# Patient Record
Sex: FEMALE | Race: WHITE | NOT HISPANIC OR LATINO | Employment: OTHER | URBAN - METROPOLITAN AREA
[De-identification: names, ages, dates, MRNs, and addresses within clinical notes are randomized per-mention and may not be internally consistent; named-entity substitution may affect disease eponyms.]

---

## 2021-11-19 PROBLEM — M81.0 SENILE OSTEOPOROSIS: Status: ACTIVE | Noted: 2021-11-19

## 2021-11-19 PROBLEM — E78.5 HYPERLIPIDEMIA: Status: ACTIVE | Noted: 2018-05-24

## 2021-11-19 PROBLEM — M15.0 PRIMARY GENERALIZED (OSTEO)ARTHRITIS: Status: ACTIVE | Noted: 2021-11-19

## 2021-11-22 PROBLEM — M53.3 SI (SACROILIAC) JOINT DYSFUNCTION: Status: ACTIVE | Noted: 2021-11-22

## 2021-11-22 PROBLEM — Z85.43 HISTORY OF OVARIAN CANCER: Status: ACTIVE | Noted: 2021-11-22

## 2021-11-22 PROBLEM — R76.8 RHEUMATOID FACTOR POSITIVE: Status: ACTIVE | Noted: 2021-11-22

## 2021-11-22 PROBLEM — M47.816 LUMBAR SPONDYLOSIS: Status: ACTIVE | Noted: 2021-11-22

## 2022-06-22 PROBLEM — E55.9 VITAMIN D DEFICIENCY: Status: ACTIVE | Noted: 2022-06-22

## 2024-01-30 ENCOUNTER — TELEPHONE (OUTPATIENT)
Age: 79
End: 2024-01-30

## 2024-02-06 ENCOUNTER — OFFICE VISIT (OUTPATIENT)
Age: 79
End: 2024-02-06
Payer: MEDICARE

## 2024-02-06 VITALS
WEIGHT: 120.8 LBS | DIASTOLIC BLOOD PRESSURE: 82 MMHG | HEIGHT: 61 IN | HEART RATE: 80 BPM | TEMPERATURE: 97.9 F | SYSTOLIC BLOOD PRESSURE: 132 MMHG | OXYGEN SATURATION: 96 % | BODY MASS INDEX: 22.81 KG/M2

## 2024-02-06 DIAGNOSIS — E55.9 VITAMIN D DEFICIENCY: ICD-10-CM

## 2024-02-06 DIAGNOSIS — M06.09 SERONEGATIVE ARTHROPATHY OF MULTIPLE SITES (HCC): ICD-10-CM

## 2024-02-06 DIAGNOSIS — M15.0 PRIMARY GENERALIZED (OSTEO)ARTHRITIS: ICD-10-CM

## 2024-02-06 DIAGNOSIS — M53.3 SI (SACROILIAC) JOINT DYSFUNCTION: ICD-10-CM

## 2024-02-06 DIAGNOSIS — R76.8 RHEUMATOID FACTOR POSITIVE: ICD-10-CM

## 2024-02-06 DIAGNOSIS — M15.4 EROSIVE OSTEOARTHRITIS OF BOTH HANDS: ICD-10-CM

## 2024-02-06 DIAGNOSIS — M81.0 SENILE OSTEOPOROSIS: Primary | ICD-10-CM

## 2024-02-06 DIAGNOSIS — M47.816 LUMBAR SPONDYLOSIS: ICD-10-CM

## 2024-02-06 PROCEDURE — 99214 OFFICE O/P EST MOD 30 MIN: CPT | Performed by: INTERNAL MEDICINE

## 2024-02-06 RX ORDER — ALENDRONATE SODIUM 70 MG/1
70 TABLET ORAL
Qty: 12 TABLET | Refills: 3 | Status: SHIPPED | OUTPATIENT
Start: 2024-02-06

## 2024-02-06 NOTE — PATIENT INSTRUCTIONS
Set up for your musculoskeletal ultrasound of your hands and wrists at [facility].  Ask Yolanda about where you should go for occupational hand therapy.  Get the lab work dated for tomorrow in the next day or 2.  Get the lab work dated for 3 months from now right before your next office visit.  Start the Fosamax weekly.  On the day that you take it, take it first thing in the morning on an empty stomach with 8 ounces of tap water.  After you take it you have to sit up or stand up for 30 minutes after which you can eat breakfast and have coffee.

## 2024-02-06 NOTE — PROGRESS NOTES
Assessment/Plan:    Senile osteoporosis  History osteoporosis treated initially with Fosamax and subsequently with Prolia.  She did have her last dose of Prolia in August.  Repeat DEXA with osteopenia left forearm.  We had discussed transitioning off of Prolia in view of stable 2.  BMD.  She would like to transition to using Fosamax rather than Reclast.  Will treat her with 1 year of Fosamax therapy and continue to monitor bone density every 2 years.  She will be due in May 2025.  Continue calcium and vitamin D supplement.  Continue home exercise program as tolerated.  She was given full instructions on dosing of Fosamax.    Primary generalized (osteo)arthritis  Primary generalized osteoarthritis with interphalangeal osteoarthritis hands and feet with deformities bilateral ring finger DIP joints.  DJD knees.  History lumbar spondylosis status post lumbar laminectomy and discectomy April 2022.  Patient referred for musculoskeletal ultrasound bilateral hands and wrist to rule out erosive osteoarthritis.  Would consider Plaquenil depending on symptoms.  Would also consider steroid injection in finger PIP joints if indicated.  Suggest occupational therapy.  Prescription given.  Follow-up 3 to 4 months or sooner if needed.    Seronegative arthropathy of multiple sites (HCC)  Prominent interphalangeal osteoarthritis.  Rule out erosive osteoarthritis.  Patient referred for musculoskeletal ultrasound bilateral hands and wrists.  Will consider Plaquenil if symptoms warrant.  Patient also referred for occupational therapy.    Erosive osteoarthritis of both hands  Interphalangeal osteoarthritis with hand arthralgias.  Rule out inflammatory OA versus erosive OA.  Patient referred for musculoskeletal ultrasound.  She was also referred for occupational therapy.  Depending on results of her musculoskeletal ultrasound and response to occupational therapy, would consider Plaquenil if symptoms warrant.  Follow-up 3 to 4 months or  sooner if needed.  Refer for laboratory evaluation to include repeat serologies to monitor for disease activity.    SI (sacroiliac) joint dysfunction  Rare low back pain status post lumbar laminectomy/discectomy.  Encourage home stretching and exercise program as tolerated.  Continue to monitor.    Rheumatoid factor positive  History positive rheumatoid factor with no clinical signs or symptoms of rheumatoid arthritis.  Rule out erosive osteoarthritis.  Patient referred for musculoskeletal ultrasound and occupational therapy for hand arthralgias.  Repeat serologies to monitor for disease activity.  Consider Plaquenil depending on response to occupational therapy and ongoing symptoms.         Problem List Items Addressed This Visit     Senile osteoporosis - Primary     History osteoporosis treated initially with Fosamax and subsequently with Prolia.  She did have her last dose of Prolia in August.  Repeat DEXA with osteopenia left forearm.  We had discussed transitioning off of Prolia in view of stable 2.  BMD.  She would like to transition to using Fosamax rather than Reclast.  Will treat her with 1 year of Fosamax therapy and continue to monitor bone density every 2 years.  She will be due in May 2025.  Continue calcium and vitamin D supplement.  Continue home exercise program as tolerated.  She was given full instructions on dosing of Fosamax.         Relevant Medications    alendronate (FOSAMAX) 70 mg tablet    Other Relevant Orders    Basic metabolic panel    Primary generalized (osteo)arthritis     Primary generalized osteoarthritis with interphalangeal osteoarthritis hands and feet with deformities bilateral ring finger DIP joints.  DJD knees.  History lumbar spondylosis status post lumbar laminectomy and discectomy April 2022.  Patient referred for musculoskeletal ultrasound bilateral hands and wrist to rule out erosive osteoarthritis.  Would consider Plaquenil depending on symptoms.  Would also consider  steroid injection in finger PIP joints if indicated.  Suggest occupational therapy.  Prescription given.  Follow-up 3 to 4 months or sooner if needed.         SI (sacroiliac) joint dysfunction     Rare low back pain status post lumbar laminectomy/discectomy.  Encourage home stretching and exercise program as tolerated.  Continue to monitor.         Rheumatoid factor positive     History positive rheumatoid factor with no clinical signs or symptoms of rheumatoid arthritis.  Rule out erosive osteoarthritis.  Patient referred for musculoskeletal ultrasound and occupational therapy for hand arthralgias.  Repeat serologies to monitor for disease activity.  Consider Plaquenil depending on response to occupational therapy and ongoing symptoms.         Lumbar spondylosis    Vitamin D deficiency    Relevant Orders    Vitamin D 25 hydroxy    Seronegative arthropathy of multiple sites (HCC)     Prominent interphalangeal osteoarthritis.  Rule out erosive osteoarthritis.  Patient referred for musculoskeletal ultrasound bilateral hands and wrists.  Will consider Plaquenil if symptoms warrant.  Patient also referred for occupational therapy.         Relevant Orders    RF Screen w/ Reflex to Titer    Cyclic citrul peptide antibody, IgG    [facility] Comprehensive Panel    [place] MSK limited    C-reactive protein    Sedimentation rate, automated    CBC and differential    Comprehensive metabolic panel    Urinalysis with microscopic    Ambulatory Referral to Occupational Therapy    Erosive osteoarthritis of both hands     Interphalangeal osteoarthritis with hand arthralgias.  Rule out inflammatory OA versus erosive OA.  Patient referred for musculoskeletal ultrasound.  She was also referred for occupational therapy.  Depending on results of her musculoskeletal ultrasound and response to occupational therapy, would consider Plaquenil if symptoms warrant.  Follow-up 3 to 4 months or sooner if needed.  Refer for laboratory evaluation to include  repeat serologies to monitor for disease activity.         Relevant Orders    RF Screen w/ Reflex to Titer    Cyclic citrul peptide antibody, IgG    [facility] Comprehensive Panel    [place] MSK limited    Ambulatory Referral to Occupational Therapy           Reviewed records, labs, and imaging with the patient in detail.  Counseled patient.  Discussion regarding my findings and recommendations.  Office visit with documentation 35 min.    Subjective:      Patient ID: Betty King is a 78 y.o. female.    Patient with osteoporosis.  She has previously been treated with Fosamax and continues on Prolia injections.  She would be due for dose #19 today.  She had a DEXA scan done in May 2023 which revealed stable to improved bone density.      She has a history of osteoarthritis as well.  She does note ring finger PIP soft tissue prominence.  She has no prolonged morning stiffness but does note chronic hand arthralgias and knee arthralgias.  She exercises regularly.  In April 2022 she underwent a lumbar laminectomy and diskectomy.   She has done very well since then.  She has a history of a positive rheumatoid factor in the past with a negative CCP antibody, however she has never developed any signs or symptoms of inflammatory arthritis.      Review of lab work dated 7/22/2023 significant for creatinine 0.77.  Estimated GFR 80.  Calcium 9.5.  Fasting blood sugar 115.  Vitamin D low at 28 treated.  Lab work dated 7/6/2023 significant for ferritin 42.  Serum iron 125.  Sodium 130.  Chloride 95.  Glucose 235.  Estimated GFR 76.  Folic acid 21.8.  Vitamin B12 501.  Methylmalonic acid 203.  CBC unremarkable.    DEXA dated 5/9/2023 significant for spine T score -0.6 L1-L2 stable.  Suspect this is falsely elevated secondary to scoliosis and degenerative spondylosis.  Left total hip -1.0 with an increase of 3.4% as compared to the prior study.  Femoral neck -0.3.  Left forearm -1.7 with an increase of 5.8% as compared to the prior study.   Patient has no history of adult fracture.        Allergies  Allergies   Allergen Reactions   • Levofloxacin Other (See Comments)   • Penicillins Other (See Comments)   • Sulfa Antibiotics Other (See Comments)       Home Medications    Current Outpatient Medications:   •  alendronate (FOSAMAX) 70 mg tablet, Take 1 tablet (70 mg total) by mouth every 7 days, Disp: 12 tablet, Rfl: 3  •  amLODIPine (NORVASC) 5 mg tablet, , Disp: , Rfl:   •  ascorbic acid (VITAMIN C) 500 MG tablet, , Disp: , Rfl:   •  Biotin 5 MG TBDP, Take by mouth, Disp: , Rfl:   •  Cholecalciferol 100 MCG (4000 UT) TABS, Take 4,000 capsules by mouth, Disp: , Rfl:   •  Estradiol 0.5 MG/0.5GM GEL, Place on the skin, Disp: , Rfl:   •  Cranberry 450 MG CAPS, Take by mouth, Disp: , Rfl:   •  ergocalciferol (VITAMIN D2) 50,000 units, Take 1 capsule (50,000 Units total) by mouth once a week for 8 doses, Disp: 8 capsule, Rfl: 0  •  lisinopril-hydrochlorothiazide (PRINZIDE,ZESTORETIC) 20-12.5 MG per tablet, Take 1 tablet by mouth daily, Disp: , Rfl:   •  Probiotic Product (PROBIOTIC PO), Take by mouth, Disp: , Rfl:     Past Medical History  History reviewed. No pertinent past medical history.    Past Surgical History   History reviewed. No pertinent surgical history.    Family History   History reviewed. No pertinent family history.    The following portions of the patient's history were reviewed and updated as appropriate: allergies, current medications, past family history, past medical history, past social history, past surgical history, and problem list.    Review of Systems   Constitutional:  Negative for chills, fatigue and fever.   HENT:  Negative for hearing loss, sore throat and tinnitus.    Eyes:  Negative for pain and visual disturbance.   Respiratory:  Negative for cough and shortness of breath.    Cardiovascular:  Negative for chest pain and palpitations.   Gastrointestinal:  Negative for abdominal pain, nausea and vomiting.   Genitourinary:   "Negative for difficulty urinating.   Musculoskeletal:  Positive for arthralgias (hand and knee arthralgias). Negative for back pain, gait problem, joint swelling, myalgias, neck pain and neck stiffness.   Skin:  Negative for rash.   Neurological:  Negative for dizziness, seizures, weakness, numbness and headaches.   Psychiatric/Behavioral:  Negative for confusion, decreased concentration and sleep disturbance.          Objective:      /82 (BP Location: Right arm, Patient Position: Sitting, Cuff Size: Adult)   Pulse 80   Temp 97.9 °F (36.6 °C) (Tympanic)   Ht 5' 1\" (1.549 m)   Wt 54.8 kg (120 lb 12.8 oz)   SpO2 96%   BMI 22.82 kg/m²          Physical Exam  Vitals reviewed.   Constitutional:       Appearance: Normal appearance.   HENT:      Head: Normocephalic.      Nose:      Comments: Nose and throat unremarkable.  Eyes:      Extraocular Movements: Extraocular movements intact.   Neck:      Comments: Without masses, thyromegaly, lymphadenopathy  Cardiovascular:      Rate and Rhythm: Normal rate and regular rhythm.   Pulmonary:      Breath sounds: Normal breath sounds.   Abdominal:      Palpations: Abdomen is soft.   Musculoskeletal:      Cervical back: Neck supple.      Comments: Decreased lateral flexion neck.  Full range of motion bilateral shoulders, elbows, wrists.  Interphalangeal OA changes, squaring 1st CMC joints, Heberden's nodes, Selina's nodes bilateral hands. Deformities RF PIP's. No synovitis noted.  Full range of motion bilateral hips, knees, ankles.  Patellofemoral crepitus noted bilateral knees.  Hyperpronation bilateral feet.   Skin:     General: Skin is warm and dry.   Neurological:      General: No focal deficit present.      Mental Status: She is alert.               This note was written in part using the assistance of the [employer] [id number]-or-rouw microphone system. Those portions using this system have been dictated and not read.  "

## 2024-02-08 ENCOUNTER — TELEPHONE (OUTPATIENT)
Age: 79
End: 2024-02-08

## 2024-02-26 NOTE — ASSESSMENT & PLAN NOTE
Primary generalized osteoarthritis with interphalangeal osteoarthritis hands and feet with deformities bilateral ring finger DIP joints.  DJD knees.  History lumbar spondylosis status post lumbar laminectomy and discectomy April 2022.  Patient referred for musculoskeletal ultrasound bilateral hands and wrist to rule out erosive osteoarthritis.  Would consider Plaquenil depending on symptoms.  Would also consider steroid injection in finger PIP joints if indicated.  Suggest occupational therapy.  Prescription given.  Follow-up 3 to 4 months or sooner if needed.

## 2024-02-26 NOTE — ASSESSMENT & PLAN NOTE
History positive rheumatoid factor with no clinical signs or symptoms of rheumatoid arthritis.  Rule out erosive osteoarthritis.  Patient referred for musculoskeletal ultrasound and occupational therapy for hand arthralgias.  Repeat serologies to monitor for disease activity.  Consider Plaquenil depending on response to occupational therapy and ongoing symptoms.

## 2024-02-26 NOTE — ASSESSMENT & PLAN NOTE
History osteoporosis treated initially with Fosamax and subsequently with Prolia.  She did have her last dose of Prolia in August.  Repeat DEXA with osteopenia left forearm.  We had discussed transitioning off of Prolia in view of stable 2.  BMD.  She would like to transition to using Fosamax rather than Reclast.  Will treat her with 1 year of Fosamax therapy and continue to monitor bone density every 2 years.  She will be due in May 2025.  Continue calcium and vitamin D supplement.  Continue home exercise program as tolerated.  She was given full instructions on dosing of Fosamax.

## 2024-02-26 NOTE — ASSESSMENT & PLAN NOTE
Rare low back pain status post lumbar laminectomy/discectomy.  Encourage home stretching and exercise program as tolerated.  Continue to monitor.

## 2024-02-26 NOTE — ASSESSMENT & PLAN NOTE
Interphalangeal osteoarthritis with hand arthralgias.  Rule out inflammatory OA versus erosive OA.  Patient referred for musculoskeletal ultrasound.  She was also referred for occupational therapy.  Depending on results of her musculoskeletal ultrasound and response to occupational therapy, would consider Plaquenil if symptoms warrant.  Follow-up 3 to 4 months or sooner if needed.  Refer for laboratory evaluation to include repeat serologies to monitor for disease activity.

## 2024-02-26 NOTE — ASSESSMENT & PLAN NOTE
Prominent interphalangeal osteoarthritis.  Rule out erosive osteoarthritis.  Patient referred for musculoskeletal ultrasound bilateral hands and wrists.  Will consider Plaquenil if symptoms warrant.  Patient also referred for occupational therapy.

## 2024-02-29 ENCOUNTER — HOSPITAL ENCOUNTER (OUTPATIENT)
Dept: RADIOLOGY | Facility: HOSPITAL | Age: 79
Discharge: HOME | End: 2024-02-29
Payer: MEDICARE

## 2024-02-29 DIAGNOSIS — M15.4 EROSIVE OSTEOARTHRITIS OF BOTH HANDS: ICD-10-CM

## 2024-02-29 DIAGNOSIS — M06.09 SERONEGATIVE ARTHROPATHY OF MULTIPLE SITES (HCC): ICD-10-CM

## 2024-02-29 PROCEDURE — 76882 US LMTD JT/FCL EVL NVASC XTR: CPT

## 2024-03-01 ENCOUNTER — TELEPHONE (OUTPATIENT)
Age: 79
End: 2024-03-01

## 2024-03-01 NOTE — TELEPHONE ENCOUNTER
Gordo from [facility] called in stating that they need the referral faxed to them for the patients Occupational Therapy.    Fax- [phone]

## 2024-03-05 LAB
25(OH)D3 SERPL-MCNC: 40 NG/ML (ref 30–100)
ANA SER QL IF: NEGATIVE
BUN SERPL-MCNC: 14 MG/DL (ref 7–25)
BUN/CREAT SERPL: ABNORMAL (CALC) (ref 6–22)
CALCIUM SERPL-MCNC: 9.6 MG/DL (ref 8.6–10.4)
CCP IGG SERPL-ACNC: <16 UNITS
CHLORIDE SERPL-SCNC: 99 MMOL/L (ref 98–110)
CO2 SERPL-SCNC: 28 MMOL/L (ref 20–32)
CREAT SERPL-MCNC: 0.8 MG/DL (ref 0.6–1)
DSDNA AB SER-ACNC: 1 IU/ML
ENA SCL70 AB SER IA-ACNC: NORMAL AI
ENA SM AB SER IA-ACNC: NORMAL AI
ENA SM+RNP AB SER IA-ACNC: NORMAL AI
ENA SS-A AB SER IA-ACNC: NORMAL AI
ENA SS-B AB SER IA-ACNC: NORMAL AI
GFR/BSA.PRED SERPLBLD CYS-BASED-ARV: 76 ML/MIN/1.73M2
GLUCOSE SERPL-MCNC: 111 MG/DL (ref 65–99)
POTASSIUM SERPL-SCNC: 4.6 MMOL/L (ref 3.5–5.3)
RHEUMATOID FACT SERPL-ACNC: 149 IU/ML
SODIUM SERPL-SCNC: 138 MMOL/L (ref 135–146)

## 2024-04-29 ENCOUNTER — TELEPHONE (OUTPATIENT)
Dept: RHEUMATOLOGY | Facility: CLINIC | Age: 79
End: 2024-04-29

## 2024-05-15 LAB
ALBUMIN SERPL-MCNC: 4.7 G/DL (ref 3.6–5.1)
ALBUMIN/GLOB SERPL: 1.7 (CALC) (ref 1–2.5)
ALP SERPL-CCNC: 57 U/L (ref 37–153)
ALT SERPL-CCNC: 11 U/L (ref 6–29)
APPEARANCE UR: CLEAR
AST SERPL-CCNC: 15 U/L (ref 10–35)
BACTERIA UR QL AUTO: ABNORMAL /HPF
BASOPHILS # BLD AUTO: 20 CELLS/UL (ref 0–200)
BASOPHILS NFR BLD AUTO: 0.4 %
BILIRUB SERPL-MCNC: 0.5 MG/DL (ref 0.2–1.2)
BILIRUB UR QL STRIP: NEGATIVE
BUN SERPL-MCNC: 13 MG/DL (ref 7–25)
BUN/CREAT SERPL: ABNORMAL (CALC) (ref 6–22)
CALCIUM SERPL-MCNC: 9.6 MG/DL (ref 8.6–10.4)
CHLORIDE SERPL-SCNC: 99 MMOL/L (ref 98–110)
CO2 SERPL-SCNC: 29 MMOL/L (ref 20–32)
COLOR UR: YELLOW
CREAT SERPL-MCNC: 0.82 MG/DL (ref 0.6–1)
CRP SERPL-MCNC: <3 MG/L
EOSINOPHIL # BLD AUTO: 41 CELLS/UL (ref 15–500)
EOSINOPHIL NFR BLD AUTO: 0.8 %
ERYTHROCYTE [DISTWIDTH] IN BLOOD BY AUTOMATED COUNT: 12.4 % (ref 11–15)
ERYTHROCYTE [SEDIMENTATION RATE] IN BLOOD BY WESTERGREN METHOD: 2 MM/H
GFR/BSA.PRED SERPLBLD CYS-BASED-ARV: 74 ML/MIN/1.73M2
GLOBULIN SER CALC-MCNC: 2.8 G/DL (CALC) (ref 1.9–3.7)
GLUCOSE SERPL-MCNC: 113 MG/DL (ref 65–99)
GLUCOSE UR QL STRIP: NEGATIVE
HCT VFR BLD AUTO: 40.5 % (ref 35–45)
HGB BLD-MCNC: 13.7 G/DL (ref 11.7–15.5)
HGB UR QL STRIP: NEGATIVE
HYALINE CASTS #/AREA URNS LPF: ABNORMAL /LPF
KETONES UR QL STRIP: ABNORMAL
LEUKOCYTE ESTERASE UR QL STRIP: ABNORMAL
LYMPHOCYTES # BLD AUTO: 1943 CELLS/UL (ref 850–3900)
LYMPHOCYTES NFR BLD AUTO: 38.1 %
MCH RBC QN AUTO: 31.8 PG (ref 27–33)
MCHC RBC AUTO-ENTMCNC: 33.8 G/DL (ref 32–36)
MCV RBC AUTO: 94 FL (ref 80–100)
MONOCYTES # BLD AUTO: 536 CELLS/UL (ref 200–950)
MONOCYTES NFR BLD AUTO: 10.5 %
NEUTROPHILS # BLD AUTO: 2560 CELLS/UL (ref 1500–7800)
NEUTROPHILS NFR BLD AUTO: 50.2 %
NITRITE UR QL STRIP: NEGATIVE
PH UR STRIP: 7 [PH] (ref 5–8)
PLATELET # BLD AUTO: 304 THOUSAND/UL (ref 140–400)
PMV BLD REES-ECKER: 9.6 FL (ref 7.5–12.5)
POTASSIUM SERPL-SCNC: 4.7 MMOL/L (ref 3.5–5.3)
PROT SERPL-MCNC: 7.5 G/DL (ref 6.1–8.1)
PROT UR QL STRIP: ABNORMAL
RBC # BLD AUTO: 4.31 MILLION/UL (ref 3.8–5.1)
RBC #/AREA URNS HPF: ABNORMAL /HPF
SODIUM SERPL-SCNC: 137 MMOL/L (ref 135–146)
SP GR UR STRIP: 1.01 (ref 1–1.03)
SQUAMOUS #/AREA URNS HPF: ABNORMAL /HPF
WBC # BLD AUTO: 5.1 THOUSAND/UL (ref 3.8–10.8)
WBC #/AREA URNS HPF: ABNORMAL /HPF

## 2024-05-23 ENCOUNTER — OFFICE VISIT (OUTPATIENT)
Age: 79
End: 2024-05-23
Payer: MEDICARE

## 2024-05-23 VITALS
HEIGHT: 61 IN | BODY MASS INDEX: 22.24 KG/M2 | TEMPERATURE: 97.7 F | SYSTOLIC BLOOD PRESSURE: 120 MMHG | DIASTOLIC BLOOD PRESSURE: 76 MMHG | HEART RATE: 64 BPM | WEIGHT: 117.8 LBS | OXYGEN SATURATION: 97 %

## 2024-05-23 DIAGNOSIS — Z79.899 ENCOUNTER FOR LONG-TERM (CURRENT) USE OF OTHER MEDICATIONS: ICD-10-CM

## 2024-05-23 DIAGNOSIS — M47.816 LUMBAR SPONDYLOSIS: ICD-10-CM

## 2024-05-23 DIAGNOSIS — M06.09 SERONEGATIVE ARTHROPATHY OF MULTIPLE SITES (HCC): ICD-10-CM

## 2024-05-23 DIAGNOSIS — Z85.43 HISTORY OF OVARIAN CANCER: ICD-10-CM

## 2024-05-23 DIAGNOSIS — C80.1 CANCER (HCC): ICD-10-CM

## 2024-05-23 DIAGNOSIS — M15.4 EROSIVE OSTEOARTHRITIS OF BOTH HANDS: ICD-10-CM

## 2024-05-23 DIAGNOSIS — M53.3 SI (SACROILIAC) JOINT DYSFUNCTION: ICD-10-CM

## 2024-05-23 DIAGNOSIS — M81.0 SENILE OSTEOPOROSIS: ICD-10-CM

## 2024-05-23 DIAGNOSIS — E55.9 VITAMIN D DEFICIENCY: ICD-10-CM

## 2024-05-23 DIAGNOSIS — R76.8 RHEUMATOID FACTOR POSITIVE: Primary | ICD-10-CM

## 2024-05-23 DIAGNOSIS — M15.0 PRIMARY GENERALIZED (OSTEO)ARTHRITIS: ICD-10-CM

## 2024-05-23 PROCEDURE — 99214 OFFICE O/P EST MOD 30 MIN: CPT | Performed by: INTERNAL MEDICINE

## 2024-05-23 RX ORDER — HYDROXYCHLOROQUINE SULFATE 200 MG/1
TABLET, FILM COATED ORAL
Qty: 135 TABLET | Refills: 1 | Status: SHIPPED | OUTPATIENT
Start: 2024-05-23 | End: 2024-11-23

## 2024-05-23 NOTE — PROGRESS NOTES
Assessment/Plan:    Seronegative arthropathy of multiple sites (HCC)  Musculoskeletal ultrasound dated 2/29/2024 significant for right greater than left synovial hypertrophy consistent with erosive osteoarthritis with hyperemia most notable at the right second MCP and right fourth PIP joints consistent with active synovitis.  Have suggested trial of disease modifying medication with Plaquenil in view of its excellent safety profile.  Will start 200 mg daily for 3 weeks and increase to 300 mg daily thereafter, in view of her BMI.  I have asked her to see the eye doctor within 3 to 4 months of starting Plaquenil and every 6 to 12 months thereafter to monitor for Plaquenil toxicity.  Will likely repeat musculoskeletal ultrasound 6 months after starting Plaquenil 2 assess response to therapy.  Could consider low-dose prednisone if symptoms persist.  Monitor disease activity and medication toxicity with lab work as ordered.  Plan follow-up in September or sooner if needed.  I told her that she could be referred back to occupational therapy if her symptoms were bothersome.    SI (sacroiliac) joint dysfunction  Rare low back pain status post lumbar laminectomy/discectomy.  Encourage home stretching and exercise program as tolerated.  Continue to monitor.    Senile osteoporosis  History osteoporosis treated initially with Fosamax and subsequently with Prolia, who transitioned off of Prolia to Fosamax last August after her Prolia dose.  Will plan to treat her with 1 year of Fosamax therapy to complete transition and monitor bone density every 2 years.  She will be due in May 2025.  Continue calcium and vitamin D supplement and home exercise program as tolerated.  Continue to monitor.    Primary generalized (osteo)arthritis  Primary generalized osteoarthritis with interphalangeal osteoarthritis hands and feet with deformities bilateral ring finger DIP joints.  Musculoskeletal ultrasound consistent with erosive osteoarthritis.   She will be starting Plaquenil which hopefully will improve her symptomatically.  I did discuss the risk-benefit profile of Plaquenil in detail with the patient as well as indication and administration.  She will be monitored by her eye doctor for Plaquenil toxicity.  DJD knees.  History lumbar spondylosis status post lumbar laminectomy and discectomy April 2022 with no radicular symptoms.  Encourage home exercise program as tolerated.  Follow-up September or sooner if needed.    Erosive osteoarthritis of both hands  Musculoskeletal ultrasound 2/29/2024 consistent with erosive osteoarthritis of hyperemia most notable at the right second MCP and right fourth PIP joint.  In view of active inflammatory synovitis, I discussed the risk-benefit profile of Plaquenil as well as indication and administration, and need for ophthalmologic monitoring to rule out eye toxicity, with the patient in detail.  Will begin Plaquenil 200 mg daily for 3 weeks to be increased to 300 mg daily thereafter based on her weight.  Monitor disease activity and medication toxicity with lab work as ordered.  Will tentatively plan to repeat musculoskeletal ultrasound in 6 months to monitor response to therapy.  Follow-up September or sooner if needed.    Rheumatoid factor positive  History of positive rheumatoid factor with musculoskeletal ultrasound consistent with erosive osteoarthritis.  Patient is to start Plaquenil as directed.  Will plan to repeat musculoskeletal ultrasound in 6 months to assess for response to therapy.  Patient will be monitored by her eye doctor to rule out Plaquenil eye toxicity.  Will refer her back to occupational therapy for additional treatment if symptoms warrant.  Continue to monitor.    History of ovarian cancer  History of ovarian cancer status post total hysterectomy in the past.  No recurrence.         Problem List Items Addressed This Visit       Senile osteoporosis     History osteoporosis treated initially  with Fosamax and subsequently with Prolia, who transitioned off of Prolia to Fosamax last August after her Prolia dose.  Will plan to treat her with 1 year of Fosamax therapy to complete transition and monitor bone density every 2 years.  She will be due in May 2025.  Continue calcium and vitamin D supplement and home exercise program as tolerated.  Continue to monitor.         Primary generalized (osteo)arthritis     Primary generalized osteoarthritis with interphalangeal osteoarthritis hands and feet with deformities bilateral ring finger DIP joints.  Musculoskeletal ultrasound consistent with erosive osteoarthritis.  She will be starting Plaquenil which hopefully will improve her symptomatically.  I did discuss the risk-benefit profile of Plaquenil in detail with the patient as well as indication and administration.  She will be monitored by her eye doctor for Plaquenil toxicity.  DJD knees.  History lumbar spondylosis status post lumbar laminectomy and discectomy April 2022 with no radicular symptoms.  Encourage home exercise program as tolerated.  Follow-up September or sooner if needed.         SI (sacroiliac) joint dysfunction     Rare low back pain status post lumbar laminectomy/discectomy.  Encourage home stretching and exercise program as tolerated.  Continue to monitor.         Rheumatoid factor positive - Primary     History of positive rheumatoid factor with musculoskeletal ultrasound consistent with erosive osteoarthritis.  Patient is to start Plaquenil as directed.  Will plan to repeat musculoskeletal ultrasound in 6 months to assess for response to therapy.  Patient will be monitored by her eye doctor to rule out Plaquenil eye toxicity.  Will refer her back to occupational therapy for additional treatment if symptoms warrant.  Continue to monitor.         Lumbar spondylosis    History of ovarian cancer     History of ovarian cancer status post total hysterectomy in the past.  No recurrence.          Vitamin D deficiency    Seronegative arthropathy of multiple sites (HCC)     Musculoskeletal ultrasound dated 2/29/2024 significant for right greater than left synovial hypertrophy consistent with erosive osteoarthritis with hyperemia most notable at the right second MCP and right fourth PIP joints consistent with active synovitis.  Have suggested trial of disease modifying medication with Plaquenil in view of its excellent safety profile.  Will start 200 mg daily for 3 weeks and increase to 300 mg daily thereafter, in view of her BMI.  I have asked her to see the eye doctor within 3 to 4 months of starting Plaquenil and every 6 to 12 months thereafter to monitor for Plaquenil toxicity.  Will likely repeat musculoskeletal ultrasound 6 months after starting Plaquenil 2 assess response to therapy.  Could consider low-dose prednisone if symptoms persist.  Monitor disease activity and medication toxicity with lab work as ordered.  Plan follow-up in September or sooner if needed.  I told her that she could be referred back to occupational therapy if her symptoms were bothersome.         Erosive osteoarthritis of both hands     Musculoskeletal ultrasound 2/29/2024 consistent with erosive osteoarthritis of hyperemia most notable at the right second MCP and right fourth PIP joint.  In view of active inflammatory synovitis, I discussed the risk-benefit profile of Plaquenil as well as indication and administration, and need for ophthalmologic monitoring to rule out eye toxicity, with the patient in detail.  Will begin Plaquenil 200 mg daily for 3 weeks to be increased to 300 mg daily thereafter based on her weight.  Monitor disease activity and medication toxicity with lab work as ordered.  Will tentatively plan to repeat musculoskeletal ultrasound in 6 months to monitor response to therapy.  Follow-up September or sooner if needed.         Relevant Medications    hydroxychloroquine (PLAQUENIL) 200 mg tablet    Other Relevant  Orders    C-reactive protein    Sedimentation rate, automated    CBC and differential    Comprehensive metabolic panel    Urinalysis with microscopic    Cancer (HCC)     Other Visit Diagnoses       Encounter for long-term (current) use of other medications        Relevant Orders    C-reactive protein    Sedimentation rate, automated    CBC and differential    Comprehensive metabolic panel    Urinalysis with microscopic                Reviewed records, labs, and imaging with the patient in detail.  Counseled patient.  Discussion regarding my findings and recommendations.  Office visit with documentation 35 min.    Subjective:      Patient ID: Betty King is a 78 y.o. female.    Patient with osteoporosis.  She has previously been treated with Fosamax and continued on Prolia injections until February 2024 at which time she started transitioning off of Prolia with weekly alendronate, in view of marked improvement in BMD with no history of adult fracture. She had a DEXA scan done in May 2023 which revealed stable to improved bone density.      She has a history of osteoarthritis as well.  She does note ring finger PIP soft tissue prominence.  She has no prolonged morning stiffness but does note chronic hand arthralgias and knee arthralgias.  She exercises regularly.  The patient completed a course of occupational therapy with excellent success.  She does have a home program as instructed by the occupational therapist.  In April 2022 she underwent a lumbar laminectomy and diskectomy.   She has done very well since then.  She has a history of a positive rheumatoid factor in the past with a negative CCP antibody, however she has never developed any signs or symptoms of inflammatory arthritis.      Review of lab work dated 5/14/2024 significant for asymptomatic pyuria.  Random glucose 113.  Creatinine 0.82 with estimated GFR 74.  Sed rate 2.  CRP less than 3.0.  CBC unremarkable.  Review of lab work dated 3/1/2024  significant for vitamin D 40.  CCP negative.  Rheumatoid factor positive at a dilution of 149.  Sjogren's antibodies negative.  Anti-Wilson antibodies and anti-Smith/RNP antibodies negative.  Scleroderma antibodies negative.  Double-stranded DNA antibodies negative.  AUSTIN negative.  Review of lab work dated 7/22/2023 significant for creatinine 0.77.  Estimated GFR 80.  Calcium 9.5.  Fasting blood sugar 115.  Vitamin D low at 28 treated.  Lab work dated 7/6/2023 significant for ferritin 42.  Serum iron 125.  Sodium 130.  Chloride 95.  Glucose 235.  Estimated GFR 76.  Folic acid 21.8.  Vitamin B12 501.  Methylmalonic acid 203.  CBC unremarkable.    DEXA dated 5/9/2023 significant for spine T score -0.6 L1-L2 stable.  Suspect this is falsely elevated secondary to scoliosis and degenerative spondylosis.  Left total hip -1.0 with an increase of 3.4% as compared to the prior study.  Femoral neck -0.3.  Left forearm -1.7 with an increase of 5.8% as compared to the prior study.  Patient has no history of adult fracture.    Musculoskeletal ultrasound dated 2/29/2024 significant for right greater than left synovial hypertrophy of the distal joints particularly consistent with erosive osteoarthritis with hyperemia most notable at the right second MCP joint and the right fourth PIP joints.  This is consistent with active synovitis.        Allergies  Allergies   Allergen Reactions    Doxycycline Hives    Levofloxacin Other (See Comments)    Penicillins Other (See Comments)    Sulfa Antibiotics Other (See Comments)       Home Medications    Current Outpatient Medications:     alendronate (FOSAMAX) 70 mg tablet, Take 1 tablet (70 mg total) by mouth every 7 days, Disp: 12 tablet, Rfl: 3    amLODIPine (NORVASC) 5 mg tablet, , Disp: , Rfl:     ascorbic acid (VITAMIN C) 500 MG tablet, , Disp: , Rfl:     Biotin 5 MG TBDP, Take by mouth, Disp: , Rfl:     Cholecalciferol 100 MCG (4000 UT) TABS, Take 4,000 capsules by mouth, Disp: , Rfl:      Estradiol 0.5 MG/0.5GM GEL, Place on the skin, Disp: , Rfl:     hydroxychloroquine (PLAQUENIL) 200 mg tablet, Take 1 tablet daily for 3 weeks then increase to 1-1/2 tablets daily thereafter, Disp: 135 tablet, Rfl: 1    Cranberry 450 MG CAPS, Take by mouth (Patient not taking: Reported on 5/23/2024), Disp: , Rfl:     ergocalciferol (VITAMIN D2) 50,000 units, Take 1 capsule (50,000 Units total) by mouth once a week for 8 doses (Patient not taking: Reported on 5/23/2024), Disp: 8 capsule, Rfl: 0    lisinopril-hydrochlorothiazide (PRINZIDE,ZESTORETIC) 20-12.5 MG per tablet, Take 1 tablet by mouth daily (Patient not taking: Reported on 5/23/2024), Disp: , Rfl:     Probiotic Product (PROBIOTIC PO), Take by mouth (Patient not taking: Reported on 5/23/2024), Disp: , Rfl:     Past Medical History  Past Medical History:   Diagnosis Date    Cancer (HCC)     ovarian    Erosive osteoarthritis     Lumbar spondylosis     Rheumatoid factor positive     Senile osteoporosis        Past Surgical History   Past Surgical History:   Procedure Laterality Date    FEMORAL HERNIA REPAIR      TOTAL ABDOMINAL HYSTERECTOMY W/ BILATERAL SALPINGOOPHORECTOMY      ovarian cancer       Family History   Family History   Problem Relation Age of Onset    Arthritis Family     Diabetes Family     Stroke Family     Heart disease Family     Thyroid disease Family        The following portions of the patient's history were reviewed and updated as appropriate: allergies, current medications, past family history, past medical history, past social history, past surgical history, and problem list.    Review of Systems   Constitutional:  Negative for chills, fatigue and fever.   HENT:  Negative for hearing loss, sore throat and tinnitus.    Eyes:  Negative for pain and visual disturbance.   Respiratory:  Negative for cough and shortness of breath.    Cardiovascular:  Negative for chest pain and palpitations.   Gastrointestinal:  Negative for abdominal pain,  "nausea and vomiting.   Genitourinary:  Negative for difficulty urinating.   Musculoskeletal:  Positive for arthralgias (hand and knee arthralgias) and joint swelling. Negative for back pain, gait problem, myalgias, neck pain and neck stiffness.   Skin:  Negative for rash.   Neurological:  Negative for dizziness, seizures, weakness, numbness and headaches.   Psychiatric/Behavioral:  Negative for confusion, decreased concentration and sleep disturbance.          Objective:      /76 (BP Location: Right arm, Patient Position: Sitting, Cuff Size: Adult)   Pulse 64   Temp 97.7 °F (36.5 °C) (Temporal)   Ht 5' 1.02\" (1.55 m)   Wt 53.4 kg (117 lb 12.8 oz)   SpO2 97%   BMI 22.24 kg/m²          Physical Exam  Vitals reviewed.   Constitutional:       Appearance: Normal appearance.   HENT:      Head: Normocephalic.      Nose:      Comments: Nose and throat unremarkable.  Eyes:      Extraocular Movements: Extraocular movements intact.   Neck:      Comments: Without masses, thyromegaly, lymphadenopathy  Cardiovascular:      Rate and Rhythm: Normal rate and regular rhythm.   Pulmonary:      Breath sounds: Normal breath sounds.   Abdominal:      Palpations: Abdomen is soft.   Musculoskeletal:      Cervical back: Neck supple.      Comments: Decreased lateral flexion neck.  Full range of motion bilateral shoulders, elbows, wrists.  Interphalangeal OA changes, squaring 1st CMC joints, Heberden's nodes, Selina's nodes bilateral hands. Deformity RF PIP.  Soft tissue prominence right second MCP and right ring finger PIP joint.  No synovitis noted.  Straight leg raising negative bilaterally.  Full range of motion bilateral hips, knees, ankles.  Patellofemoral crepitus noted bilateral knees.  Hyperpronation bilateral feet.   Skin:     General: Skin is warm and dry.   Neurological:      General: No focal deficit present.      Mental Status: She is alert.               This note was written in part using the assistance of the " [employer] [id number] microphone system. Those portions using this system have been dictated and not read.

## 2024-05-23 NOTE — PATIENT INSTRUCTIONS
Start the Plaquenil as ordered.  See the eye doctor within 3 months of starting the Plaquenil and once or twice yearly thereafter.  It is a long-acting medication but it takes a while to build up blood level.  Hopefully we will see some benefit in the next 3 to 6 months.  We will likely repeat your ultrasound in 6 months to monitor your response to the medication.  Continue the Fosamax weekly.  Continue home exercise program.  If you have a flare of your hand pain, call the office and we can give you another referral for occupational therapy for a shorter course.  Get lab work 2 weeks before your next office visit.

## 2024-06-02 NOTE — ASSESSMENT & PLAN NOTE
History osteoporosis treated initially with Fosamax and subsequently with Prolia, who transitioned off of Prolia to Fosamax last August after her Prolia dose.  Will plan to treat her with 1 year of Fosamax therapy to complete transition and monitor bone density every 2 years.  She will be due in May 2025.  Continue calcium and vitamin D supplement and home exercise program as tolerated.  Continue to monitor.

## 2024-06-02 NOTE — ASSESSMENT & PLAN NOTE
Musculoskeletal ultrasound dated 2/29/2024 significant for right greater than left synovial hypertrophy consistent with erosive osteoarthritis with hyperemia most notable at the right second MCP and right fourth PIP joints consistent with active synovitis.  Have suggested trial of disease modifying medication with Plaquenil in view of its excellent safety profile.  Will start 200 mg daily for 3 weeks and increase to 300 mg daily thereafter, in view of her BMI.  I have asked her to see the eye doctor within 3 to 4 months of starting Plaquenil and every 6 to 12 months thereafter to monitor for Plaquenil toxicity.  Will likely repeat musculoskeletal ultrasound 6 months after starting Plaquenil 2 assess response to therapy.  Could consider low-dose prednisone if symptoms persist.  Monitor disease activity and medication toxicity with lab work as ordered.  Plan follow-up in September or sooner if needed.  I told her that she could be referred back to occupational therapy if her symptoms were bothersome.

## 2024-06-02 NOTE — ASSESSMENT & PLAN NOTE
History of positive rheumatoid factor with musculoskeletal ultrasound consistent with erosive osteoarthritis.  Patient is to start Plaquenil as directed.  Will plan to repeat musculoskeletal ultrasound in 6 months to assess for response to therapy.  Patient will be monitored by her eye doctor to rule out Plaquenil eye toxicity.  Will refer her back to occupational therapy for additional treatment if symptoms warrant.  Continue to monitor.

## 2024-06-02 NOTE — ASSESSMENT & PLAN NOTE
Primary generalized osteoarthritis with interphalangeal osteoarthritis hands and feet with deformities bilateral ring finger DIP joints.  Musculoskeletal ultrasound consistent with erosive osteoarthritis.  She will be starting Plaquenil which hopefully will improve her symptomatically.  I did discuss the risk-benefit profile of Plaquenil in detail with the patient as well as indication and administration.  She will be monitored by her eye doctor for Plaquenil toxicity.  DJD knees.  History lumbar spondylosis status post lumbar laminectomy and discectomy April 2022 with no radicular symptoms.  Encourage home exercise program as tolerated.  Follow-up September or sooner if needed.

## 2024-06-02 NOTE — ASSESSMENT & PLAN NOTE
Musculoskeletal ultrasound 2/29/2024 consistent with erosive osteoarthritis of hyperemia most notable at the right second MCP and right fourth PIP joint.  In view of active inflammatory synovitis, I discussed the risk-benefit profile of Plaquenil as well as indication and administration, and need for ophthalmologic monitoring to rule out eye toxicity, with the patient in detail.  Will begin Plaquenil 200 mg daily for 3 weeks to be increased to 300 mg daily thereafter based on her weight.  Monitor disease activity and medication toxicity with lab work as ordered.  Will tentatively plan to repeat musculoskeletal ultrasound in 6 months to monitor response to therapy.  Follow-up September or sooner if needed.

## 2024-10-23 ENCOUNTER — TELEPHONE (OUTPATIENT)
Age: 79
End: 2024-10-23

## 2024-10-23 NOTE — TELEPHONE ENCOUNTER
Patient planning on having blood work done that has been ordered by Dr. Chacon on Saturday, she is also scheduled for a flu shot on Friday, will the flu shot interfere with the results of the blood work.    Patient requesting a call back

## 2024-10-23 NOTE — TELEPHONE ENCOUNTER
Notified patient as per Dr. Chacon notation. Patient had no further questions and/or concerns at this time.

## 2024-10-27 LAB
ALBUMIN SERPL-MCNC: 4.5 G/DL (ref 3.6–5.1)
ALBUMIN/GLOB SERPL: 1.9 (CALC) (ref 1–2.5)
ALP SERPL-CCNC: 48 U/L (ref 37–153)
ALT SERPL-CCNC: 12 U/L (ref 6–29)
AST SERPL-CCNC: 15 U/L (ref 10–35)
BASOPHILS # BLD AUTO: 8 CELLS/UL (ref 0–200)
BASOPHILS NFR BLD AUTO: 0.2 %
BILIRUB SERPL-MCNC: 0.7 MG/DL (ref 0.2–1.2)
BUN SERPL-MCNC: 13 MG/DL (ref 7–25)
BUN/CREAT SERPL: ABNORMAL (CALC) (ref 6–22)
CALCIUM SERPL-MCNC: 9.3 MG/DL (ref 8.6–10.4)
CHLORIDE SERPL-SCNC: 97 MMOL/L (ref 98–110)
CO2 SERPL-SCNC: 30 MMOL/L (ref 20–32)
CREAT SERPL-MCNC: 0.78 MG/DL (ref 0.6–1)
CRP SERPL-MCNC: <3 MG/L
EOSINOPHIL # BLD AUTO: 29 CELLS/UL (ref 15–500)
EOSINOPHIL NFR BLD AUTO: 0.7 %
ERYTHROCYTE [DISTWIDTH] IN BLOOD BY AUTOMATED COUNT: 11.7 % (ref 11–15)
ERYTHROCYTE [SEDIMENTATION RATE] IN BLOOD BY WESTERGREN METHOD: 2 MM/H
GFR/BSA.PRED SERPLBLD CYS-BASED-ARV: 78 ML/MIN/1.73M2
GLOBULIN SER CALC-MCNC: 2.4 G/DL (CALC) (ref 1.9–3.7)
GLUCOSE SERPL-MCNC: 107 MG/DL (ref 65–99)
HCT VFR BLD AUTO: 41.7 % (ref 35–45)
HGB BLD-MCNC: 13.9 G/DL (ref 11.7–15.5)
LYMPHOCYTES # BLD AUTO: 1352 CELLS/UL (ref 850–3900)
LYMPHOCYTES NFR BLD AUTO: 32.2 %
MCH RBC QN AUTO: 32.1 PG (ref 27–33)
MCHC RBC AUTO-ENTMCNC: 33.3 G/DL (ref 32–36)
MCV RBC AUTO: 96.3 FL (ref 80–100)
MONOCYTES # BLD AUTO: 416 CELLS/UL (ref 200–950)
MONOCYTES NFR BLD AUTO: 9.9 %
NEUTROPHILS # BLD AUTO: 2394 CELLS/UL (ref 1500–7800)
NEUTROPHILS NFR BLD AUTO: 57 %
PLATELET # BLD AUTO: 310 THOUSAND/UL (ref 140–400)
PMV BLD REES-ECKER: 9.3 FL (ref 7.5–12.5)
POTASSIUM SERPL-SCNC: 4.2 MMOL/L (ref 3.5–5.3)
PROT SERPL-MCNC: 6.9 G/DL (ref 6.1–8.1)
RBC # BLD AUTO: 4.33 MILLION/UL (ref 3.8–5.1)
SODIUM SERPL-SCNC: 135 MMOL/L (ref 135–146)
WBC # BLD AUTO: 4.2 THOUSAND/UL (ref 3.8–10.8)

## 2024-12-04 ENCOUNTER — OFFICE VISIT (OUTPATIENT)
Age: 79
End: 2024-12-04
Payer: MEDICARE

## 2024-12-04 VITALS
TEMPERATURE: 98.2 F | HEIGHT: 61 IN | OXYGEN SATURATION: 93 % | SYSTOLIC BLOOD PRESSURE: 140 MMHG | DIASTOLIC BLOOD PRESSURE: 78 MMHG | HEART RATE: 59 BPM | WEIGHT: 119 LBS | BODY MASS INDEX: 22.47 KG/M2

## 2024-12-04 DIAGNOSIS — M81.0 SENILE OSTEOPOROSIS: ICD-10-CM

## 2024-12-04 DIAGNOSIS — M15.4 EROSIVE OSTEOARTHRITIS OF BOTH HANDS: Primary | ICD-10-CM

## 2024-12-04 DIAGNOSIS — Z79.899 ENCOUNTER FOR LONG-TERM (CURRENT) USE OF MEDICATIONS: ICD-10-CM

## 2024-12-04 PROCEDURE — 99214 OFFICE O/P EST MOD 30 MIN: CPT | Performed by: PHYSICIAN ASSISTANT

## 2024-12-04 NOTE — ASSESSMENT & PLAN NOTE
Osteoporosis previously treated with Prolia.  She transitioned off of Prolia to Fosamax in February of this year.  Will plan to treat for 1 year total so she can discontinue her Fosamax in February 2025.  She will be due for an updated DEXA scan in May 2025.  We will continue to monitor her bone density every 2 years.

## 2024-12-04 NOTE — PROGRESS NOTES
Name: Betty King      : 1945      MRN: [id number]  Encounter Provider: Arianna Palacios PA-C  Encounter Date: 2024   [facility]  :  Assessment & Plan  Erosive osteoarthritis of both hands  Her erosive osteoarthritis symptoms have not improved significantly with the addition of Plaquenil.  At this time she prefers to stop the medication and monitor her symptoms off of medication.  I have recommended doing a musculoskeletal ultrasound in the next several months to monitor her inflammatory arthritis symptoms.  Labs as ordered to monitor for disease activity as well.  She can continue to use Aleve as needed.  Will plan to follow-up in 3 to 4 months or sooner if needed.    Orders:    CBC and differential; Future    Comprehensive metabolic panel; Future    Sedimentation rate, automated; Future    C-reactive protein; Future    US MSK limited; Future    Senile osteoporosis  Osteoporosis previously treated with Prolia.  She transitioned off of Prolia to Fosamax in February of this year.  Will plan to treat for 1 year total so she can discontinue her Fosamax in 2025.  She will be due for an updated DEXA scan in May 2025.  We will continue to monitor her bone density every 2 years.       Encounter for long-term (current) use of medications    Orders:    CBC and differential; Future    Comprehensive metabolic panel; Future    Sedimentation rate, automated; Future    C-reactive protein; Future        History of Present Illness     Betty King is a 79 y.o. female.  She is here for follow-up of her erosive osteoarthritis.  She had a musculoskeletal ultrasound done of her bilateral hands and wrists on 2024.  This revealed right greater than left synovial hypertrophy consistent with erosive arthritis.  Several regions of hyperemia were noted, most notable in the right second MCP and right fourth PIP joint.  In May of this year  she was started on Plaquenil.  Unfortunately she has not noticed any significant changes in her symptoms with the addition of Plaquenil.  She has most benefited from doing occupational therapy for her hands.  She also takes Aleve as needed if she does have worsening pain.    She has a history of osteoporosis which was initially treated with Fosamax followed by Prolia.  Her last dose of Prolia (#18) was given in August 2023.  She transitioned off of Prolia to Fosamax in February 2024.  She had a DEXA scan done on 5/9/2023.  Lumbar spine T-score (L1-L2) -0.6.  Left total hip T-score -1.0 which is increased 3.4% as compared to previous scan.  Left femoral neck T-score -0.3.  Left forearm T-score -1.7 which has increased 5.8% as compared to previous scan.    She has a history of generalized osteoarthritis.  She has a history of lumbar spondylosis.  She had a lumbar laminectomy and discectomy done in April 2022.    She had labs done on 10/26/2024.  CBC unremarkable.  Glucose 107.  Creatinine 0.78, GFR 78.  ESR, CRP within normal limits.            Review of Systems  Current Outpatient Medications on File Prior to Visit   Medication Sig Dispense Refill    alendronate (FOSAMAX) 70 mg tablet Take 1 tablet (70 mg total) by mouth every 7 days 12 tablet 3    amLODIPine (NORVASC) 5 mg tablet       Cholecalciferol 100 MCG (4000 UT) TABS Take 4,000 capsules by mouth      Estradiol 0.5 MG/0.5GM GEL Place on the skin      ascorbic acid (VITAMIN C) 500 MG tablet  (Patient not taking: Reported on 12/4/2024)      Biotin 5 MG TBDP Take by mouth (Patient not taking: Reported on 12/4/2024)      Cranberry 450 MG CAPS Take by mouth (Patient not taking: Reported on 5/23/2024)      ergocalciferol (VITAMIN D2) 50,000 units Take 1 capsule (50,000 Units total) by mouth once a week for 8 doses (Patient not taking: Reported on 5/23/2024) 8 capsule 0    hydroxychloroquine (PLAQUENIL) 200 mg tablet Take 1 tablet daily for 3 weeks then increase to  "1-1/2 tablets daily thereafter 135 tablet 1    lisinopril-hydrochlorothiazide (PRINZIDE,ZESTORETIC) 20-12.5 MG per tablet Take 1 tablet by mouth daily (Patient not taking: Reported on 5/23/2024)      Probiotic Product (PROBIOTIC PO) Take by mouth (Patient not taking: Reported on 5/23/2024)       No current facility-administered medications on file prior to visit.         Objective   /78   Pulse 59   Temp 98.2 °F (36.8 °C)   Ht 5' 1.02\" (1.55 m)   Wt 54 kg (119 lb)   SpO2 93%   BMI 22.47 kg/m²      Physical Exam        Dragon Dictation software was used to dictate this note. It may contain errors with dictating incorrect words/spelling. Please contact provider directly for any questions.    "

## 2024-12-04 NOTE — ASSESSMENT & PLAN NOTE
Her erosive osteoarthritis symptoms have not improved significantly with the addition of Plaquenil.  At this time she prefers to stop the medication and monitor her symptoms off of medication.  I have recommended doing a musculoskeletal ultrasound in the next several months to monitor her inflammatory arthritis symptoms.  Labs as ordered to monitor for disease activity as well.  She can continue to use Aleve as needed.  Will plan to follow-up in 3 to 4 months or sooner if needed.    Orders:    CBC and differential; Future    Comprehensive metabolic panel; Future    Sedimentation rate, automated; Future    C-reactive protein; Future    US MSK limited; Future

## 2025-02-19 ENCOUNTER — RESULTS FOLLOW-UP (OUTPATIENT)
Age: 80
End: 2025-02-19

## 2025-02-19 DIAGNOSIS — R79.89 ABNORMAL CBC: Primary | ICD-10-CM

## 2025-02-19 LAB
ALBUMIN SERPL-MCNC: 4.4 G/DL (ref 3.6–5.1)
ALBUMIN/GLOB SERPL: 1.5 (CALC) (ref 1–2.5)
ALP SERPL-CCNC: 58 U/L (ref 37–153)
ALT SERPL-CCNC: 13 U/L (ref 6–29)
AST SERPL-CCNC: 15 U/L (ref 10–35)
BASOPHILS # BLD AUTO: 18 CELLS/UL (ref 0–200)
BASOPHILS NFR BLD AUTO: 0.3 %
BILIRUB SERPL-MCNC: 0.7 MG/DL (ref 0.2–1.2)
BUN SERPL-MCNC: 12 MG/DL (ref 7–25)
BUN/CREAT SERPL: ABNORMAL (CALC) (ref 6–22)
CALCIUM SERPL-MCNC: 9.3 MG/DL (ref 8.6–10.4)
CHLORIDE SERPL-SCNC: 97 MMOL/L (ref 98–110)
CO2 SERPL-SCNC: 30 MMOL/L (ref 20–32)
CREAT SERPL-MCNC: 0.68 MG/DL (ref 0.6–1)
CRP SERPL-MCNC: <3 MG/L
EOSINOPHIL # BLD AUTO: 30 CELLS/UL (ref 15–500)
EOSINOPHIL NFR BLD AUTO: 0.5 %
ERYTHROCYTE [DISTWIDTH] IN BLOOD BY AUTOMATED COUNT: 13 % (ref 11–15)
ERYTHROCYTE [SEDIMENTATION RATE] IN BLOOD BY WESTERGREN METHOD: NORMAL MM/H
GFR/BSA.PRED SERPLBLD CYS-BASED-ARV: 90 ML/MIN/1.73M2
GLOBULIN SER CALC-MCNC: 2.9 G/DL (CALC) (ref 1.9–3.7)
GLUCOSE SERPL-MCNC: 116 MG/DL (ref 65–99)
HCT VFR BLD AUTO: 39.2 % (ref 35–45)
HGB BLD-MCNC: 13.3 G/DL (ref 11.7–15.5)
LYMPHOCYTES # BLD AUTO: 1380 CELLS/UL (ref 850–3900)
LYMPHOCYTES NFR BLD AUTO: 23 %
MCH RBC QN AUTO: 32.2 PG (ref 27–33)
MCHC RBC AUTO-ENTMCNC: 33.9 G/DL (ref 32–36)
MCV RBC AUTO: 94.9 FL (ref 80–100)
MONOCYTES # BLD AUTO: 582 CELLS/UL (ref 200–950)
MONOCYTES NFR BLD AUTO: 9.7 %
NEUTROPHILS # BLD AUTO: 3990 CELLS/UL (ref 1500–7800)
NEUTROPHILS NFR BLD AUTO: 66.5 %
PLATELET # BLD AUTO: 344 THOUSAND/UL (ref 140–400)
PMV BLD REES-ECKER: 9.3 FL (ref 7.5–12.5)
POTASSIUM SERPL-SCNC: 4 MMOL/L (ref 3.5–5.3)
PROT SERPL-MCNC: 7.3 G/DL (ref 6.1–8.1)
RBC # BLD AUTO: 4.13 MILLION/UL (ref 3.8–5.1)
SERVICE CMNT-IMP: NORMAL
SODIUM SERPL-SCNC: 133 MMOL/L (ref 135–146)
WBC # BLD AUTO: 6 THOUSAND/UL (ref 3.8–10.8)

## 2025-02-21 NOTE — TELEPHONE ENCOUNTER
Pt calls back and would like to know why these additional test were ordered when she took a look at results everything looked good when looking at the whole range. She states we would also have to send script to her home as she goes to [facility] and needs to bring them with because they never seem to get them.

## 2025-02-25 NOTE — TELEPHONE ENCOUNTER
----- Message from Amaya DRUMMOND sent at 2/24/2025 10:52 AM EST -----    ----- Message -----  From: Bibi Moses MA  Sent: 2/21/2025   1:37 PM EST  To: [facility]

## 2025-02-25 NOTE — TELEPHONE ENCOUNTER
The pathologist noted some abnormalities in her blood cells which is why the additional lab work was ordered.

## 2025-03-25 ENCOUNTER — APPOINTMENT (OUTPATIENT)
Dept: LAB | Facility: CLINIC | Age: 80
End: 2025-03-25
Payer: MEDICARE

## 2025-03-25 ENCOUNTER — HOSPITAL ENCOUNTER (OUTPATIENT)
Dept: RADIOLOGY | Facility: HOSPITAL | Age: 80
Discharge: HOME | End: 2025-03-25
Payer: MEDICARE

## 2025-03-25 DIAGNOSIS — M15.4 EROSIVE OSTEOARTHRITIS OF BOTH HANDS: ICD-10-CM

## 2025-03-25 DIAGNOSIS — R79.89 ABNORMAL CBC: ICD-10-CM

## 2025-03-25 LAB
BASOPHILS # BLD AUTO: 0.02 THOUSANDS/ÂΜL (ref 0–0.1)
BASOPHILS NFR BLD AUTO: 0 % (ref 0–1)
DAT POLY-SP REAG RBC QL: NEGATIVE
EOSINOPHIL # BLD AUTO: 0.04 THOUSAND/ÂΜL (ref 0–0.61)
EOSINOPHIL NFR BLD AUTO: 1 % (ref 0–6)
ERYTHROCYTE [DISTWIDTH] IN BLOOD BY AUTOMATED COUNT: 13.3 % (ref 11.6–15.1)
HCT VFR BLD AUTO: 40.9 % (ref 34.8–46.1)
HGB BLD-MCNC: 13.7 G/DL (ref 11.5–15.4)
HGB RETIC QN AUTO: 36.2 PG (ref 30–38.3)
IMM GRANULOCYTES # BLD AUTO: 0.02 THOUSAND/UL (ref 0–0.2)
IMM GRANULOCYTES NFR BLD AUTO: 0 % (ref 0–2)
IMM RETICS NFR: 8 % (ref 0–14)
LDH SERPL-CCNC: 189 U/L (ref 140–271)
LYMPHOCYTES # BLD AUTO: 1.52 THOUSANDS/ÂΜL (ref 0.6–4.47)
LYMPHOCYTES NFR BLD AUTO: 21 % (ref 14–44)
MCH RBC QN AUTO: 32.6 PG (ref 26.8–34.3)
MCHC RBC AUTO-ENTMCNC: 33.5 G/DL (ref 31.4–37.4)
MCV RBC AUTO: 97 FL (ref 82–98)
MONOCYTES # BLD AUTO: 0.71 THOUSAND/ÂΜL (ref 0.17–1.22)
MONOCYTES NFR BLD AUTO: 10 % (ref 4–12)
NEUTROPHILS # BLD AUTO: 4.98 THOUSANDS/ÂΜL (ref 1.85–7.62)
NEUTS SEG NFR BLD AUTO: 68 % (ref 43–75)
NRBC BLD AUTO-RTO: 0 /100 WBCS
PLATELET # BLD AUTO: 328 THOUSANDS/UL (ref 149–390)
PMV BLD AUTO: 9.5 FL (ref 8.9–12.7)
RBC # BLD AUTO: 4.2 MILLION/UL (ref 3.81–5.12)
RETICS # AUTO: NORMAL 10*3/UL (ref 14097–95744)
RETICS # CALC: 1.4 % (ref 0.37–1.87)
WBC # BLD AUTO: 7.29 THOUSAND/UL (ref 4.31–10.16)

## 2025-03-25 PROCEDURE — 83615 LACTATE (LD) (LDH) ENZYME: CPT

## 2025-03-25 PROCEDURE — 85025 COMPLETE CBC W/AUTO DIFF WBC: CPT

## 2025-03-25 PROCEDURE — 76882 US LMTD JT/FCL EVL NVASC XTR: CPT

## 2025-03-25 PROCEDURE — 86157 COLD AGGLUTININ TITER: CPT

## 2025-03-25 PROCEDURE — 83010 ASSAY OF HAPTOGLOBIN QUANT: CPT

## 2025-03-25 PROCEDURE — 86880 COOMBS TEST DIRECT: CPT

## 2025-03-25 PROCEDURE — 36415 COLL VENOUS BLD VENIPUNCTURE: CPT

## 2025-03-25 PROCEDURE — 85046 RETICYTE/HGB CONCENTRATE: CPT

## 2025-03-25 PROCEDURE — 82595 ASSAY OF CRYOGLOBULIN: CPT

## 2025-03-26 LAB
CA TITR SERPL AGGL: NEGATIVE {TITER}
HAPTOGLOB SERPL-MCNC: 196 MG/DL (ref 42–346)

## 2025-03-31 LAB — CRYOGLOB SER QL 1D COLD INC: NORMAL

## 2025-04-24 ENCOUNTER — OFFICE VISIT (OUTPATIENT)
Age: 80
End: 2025-04-24
Payer: MEDICARE

## 2025-04-24 VITALS
WEIGHT: 115.4 LBS | SYSTOLIC BLOOD PRESSURE: 130 MMHG | HEIGHT: 61 IN | TEMPERATURE: 98 F | BODY MASS INDEX: 21.79 KG/M2 | HEART RATE: 85 BPM | DIASTOLIC BLOOD PRESSURE: 70 MMHG | OXYGEN SATURATION: 96 %

## 2025-04-24 DIAGNOSIS — M15.4 EROSIVE OSTEOARTHRITIS OF BOTH HANDS: Primary | ICD-10-CM

## 2025-04-24 DIAGNOSIS — M81.0 SENILE OSTEOPOROSIS: ICD-10-CM

## 2025-04-24 DIAGNOSIS — Z79.899 ENCOUNTER FOR LONG-TERM (CURRENT) USE OF MEDICATIONS: ICD-10-CM

## 2025-04-24 PROCEDURE — 99214 OFFICE O/P EST MOD 30 MIN: CPT | Performed by: PHYSICIAN ASSISTANT

## 2025-04-24 NOTE — ASSESSMENT & PLAN NOTE
History of osteoporosis previously treated with Prolia and most recently Fosamax.  Her most recent DEXA scan done in May 2023 revealed mild osteopenia which had increased as compared to her previous scan.  She is due for an updated DEXA scan in May of this year.  Will continue to monitor her bone density every 2 years.    Orders:    DXA bone density spine hip and pelvis; Future

## 2025-04-24 NOTE — ASSESSMENT & PLAN NOTE
Her erosive osteoarthritis symptoms are generally stable at this time.  She has tried Plaquenil in the past however she did not notice any significant change in her symptoms.  She is not interested in adding additional medication at this time.  Will continue with home exercise program as taught by occupational therapy.  She is also using Aleve as needed which does help with her symptoms.  Labs as ordered to monitor for medication side effects and toxicity.  Plan to follow-up in 3 to 4 months or sooner if needed.    Orders:    CBC and differential; Future    Comprehensive metabolic panel; Future    Sedimentation rate, automated; Future    C-reactive protein; Future

## 2025-04-24 NOTE — PROGRESS NOTES
Name: Betty King      : 1945      MRN: [id number]  Encounter Provider: Arianna Palacios PA-C  Encounter Date: 2025   [facility]  :  Assessment & Plan  Erosive osteoarthritis of both hands  Her erosive osteoarthritis symptoms are generally stable at this time.  She has tried Plaquenil in the past however she did not notice any significant change in her symptoms.  She is not interested in adding additional medication at this time.  Will continue with home exercise program as taught by occupational therapy.  She is also using Aleve as needed which does help with her symptoms.  Labs as ordered to monitor for medication side effects and toxicity.  Plan to follow-up in 3 to 4 months or sooner if needed.    Orders:    CBC and differential; Future    Comprehensive metabolic panel; Future    Sedimentation rate, automated; Future    C-reactive protein; Future    Senile osteoporosis  History of osteoporosis previously treated with Prolia and most recently Fosamax.  Her most recent DEXA scan done in May 2023 revealed mild osteopenia which had increased as compared to her previous scan.  She is due for an updated DEXA scan in May of this year.  Will continue to monitor her bone density every 2 years.    Orders:    DXA bone density spine hip and pelvis; Future    Encounter for long-term (current) use of medications    Orders:    CBC and differential; Future    Comprehensive metabolic panel; Future    Sedimentation rate, automated; Future    C-reactive protein; Future        History of Present Illness   Betty King is a 79 y.o. female.  She is here for follow-up of her erosive osteoarthritis.  She had a musculoskeletal ultrasound done of her bilateral hands and wrists on 2024.  This revealed right greater than left synovial hypertrophy consistent with erosive arthritis.  Several regions of hyperemia were noted, most notable in the  right second MCP and right fourth PIP joint.  In May 2024 she was started on Plaquenil.  Unfortunately she did not notice any significant changes in her symptoms with the addition of Plaquenil and discontinued this and December 2024.  She has most benefited from doing occupational therapy for her hands.  She also takes Aleve as needed if she does have worsening pain.  Her symptoms overall are stable.  She does notice occasional numbness and tingling in her hands with repetitive activities.    She had a musculoskeletal ultrasound done of her bilateral hands and wrists on 3/25/2025.  This revealed degenerative osteoarthritis with superimposed inflammatory arthropathy consistent with history of erosive osteoarthritis.  No significant interval progression of disease.  No significant synovitis was noted.     She has a history of osteoporosis which was initially treated with Fosamax followed by Prolia.  Her last dose of Prolia (#18) was given in August 2023.  She transitioned off of Prolia to Fosamax in February 2024.  She had a DEXA scan done on 5/9/2023.  Lumbar spine T-score (L1-L2) -0.6.  Left total hip T-score -1.0 which is increased 3.4% as compared to previous scan.  Left femoral neck T-score -0.3.  Left forearm T-score -1.7 which has increased 5.8% as compared to previous scan.     She has a history of generalized osteoarthritis.  She has a history of lumbar spondylosis.  She had a lumbar laminectomy and discectomy done in April 2022.    She had labs done on 2/18/2025.  Glucose 116.  Sodium 133.  Creatinine 0.68, GFR 90.  ESR, CRP within normal limits.  On her CBC cold agglutinin/cryoglobulin was suspected.  She had additional lab work done on 3/25/2025.  Cryoglobulins negative.  LD within normal limits.  Haptoglobin within normal limits.  Direct Luis M negative.  Cold agglutinin titer negative.            Review of Systems  Current Outpatient Medications on File Prior to Visit   Medication Sig Dispense Refill     "alendronate (FOSAMAX) 70 mg tablet Take 1 tablet (70 mg total) by mouth every 7 days 12 tablet 3    amLODIPine (NORVASC) 5 mg tablet       Cholecalciferol 100 MCG (4000 UT) TABS Take 4,000 capsules by mouth      Estradiol 0.5 MG/0.5GM GEL Place on the skin      ascorbic acid (VITAMIN C) 500 MG tablet  (Patient not taking: Reported on 4/24/2025)      Biotin 5 MG TBDP Take by mouth (Patient not taking: Reported on 4/24/2025)      Cranberry 450 MG CAPS Take by mouth (Patient not taking: Reported on 5/23/2024)      ergocalciferol (VITAMIN D2) 50,000 units Take 1 capsule (50,000 Units total) by mouth once a week for 8 doses (Patient not taking: Reported on 5/23/2024) 8 capsule 0    hydroxychloroquine (PLAQUENIL) 200 mg tablet Take 1 tablet daily for 3 weeks then increase to 1-1/2 tablets daily thereafter 135 tablet 1    lisinopril-hydrochlorothiazide (PRINZIDE,ZESTORETIC) 20-12.5 MG per tablet Take 1 tablet by mouth daily (Patient not taking: Reported on 5/23/2024)      Probiotic Product (PROBIOTIC PO) Take by mouth (Patient not taking: Reported on 5/23/2024)       No current facility-administered medications on file prior to visit.         Objective   /70 (BP Location: Left arm, Patient Position: Sitting, Cuff Size: Adult)   Pulse 85   Temp 98 °F (36.7 °C) (Tympanic)   Ht 5' 1\" (1.549 m)   Wt 52.3 kg (115 lb 6.4 oz)   SpO2 96%   BMI 21.80 kg/m²      Physical Exam  Constitutional:       Appearance: Normal appearance.   Cardiovascular:      Rate and Rhythm: Normal rate and regular rhythm.   Pulmonary:      Breath sounds: Normal breath sounds.   Musculoskeletal:         General: No swelling or tenderness.   Skin:     General: Skin is warm and dry.   Neurological:      General: No focal deficit present.      Mental Status: She is alert.           Dragon Dictation software was used to dictate this note. It may contain errors with dictating incorrect words/spelling. Please contact provider directly for any " questions.

## 2025-04-25 ENCOUNTER — TELEPHONE (OUTPATIENT)
Dept: ADMINISTRATIVE | Facility: OTHER | Age: 80
End: 2025-04-25

## 2025-04-25 NOTE — TELEPHONE ENCOUNTER
----- Message from Arianna Palacios PA-C sent at 4/24/2025  9:19 AM EDT -----  Regarding: Care Gap Request  04/24/25 9:19 AM    Hello, our patient attached above has had DEXA Scan completed/performed. Please assist in updating the patient chart by pulling the document from the Media Tab. The date of service is 05/16/2023.     Thank you,  Arianna Palacios PA-C   RHEUMATOLOGY [facility]

## 2025-04-25 NOTE — TELEPHONE ENCOUNTER
Upon review of the In Basket request we were able to locate, review, and update the patient chart as requested for DEXA Scan.    Any additional questions or concerns should be emailed to the Practice Liaisons via the appropriate education email address, please do not reply via In Basket.    Thank you  Rory Rangel MA   [facility]

## 2025-08-04 DIAGNOSIS — M81.0 SENILE OSTEOPOROSIS: ICD-10-CM
